# Patient Record
Sex: FEMALE | Race: BLACK OR AFRICAN AMERICAN | Employment: UNEMPLOYED | URBAN - METROPOLITAN AREA
[De-identification: names, ages, dates, MRNs, and addresses within clinical notes are randomized per-mention and may not be internally consistent; named-entity substitution may affect disease eponyms.]

---

## 2019-06-17 ENCOUNTER — DOCUMENTATION ONLY (OUTPATIENT)
Dept: INTERNAL MEDICINE CLINIC | Age: 59
End: 2019-06-17

## 2019-06-17 NOTE — PROGRESS NOTES
Pharmacist Note: Immunization Update    Patient: Leilani Arellano (55 y.o., 1960)     Patient's immunization history was updated to reflect information contained in the [place] and/or outside immunization/pharmacy records were reconciled within [facility]. Health Maintenance schedule updated when appropriate.     Current immunizations now reflect:       Immunization History   Administered Date(s) Administered    Influenza Vaccine 09/28/2015, 11/02/2016, 08/17/2017, 10/12/2018    Pneumococcal Polysaccharide (PPSV-23) 08/17/2017       Blanca Jaiml, PharmD, BCACP

## 2019-07-31 ENCOUNTER — APPOINTMENT (OUTPATIENT)
Dept: GENERAL RADIOLOGY | Age: 59
End: 2019-07-31
Attending: EMERGENCY MEDICINE
Payer: MEDICARE

## 2019-07-31 ENCOUNTER — HOSPITAL ENCOUNTER (OUTPATIENT)
Age: 59
Setting detail: OBSERVATION
Discharge: HOME | End: 2019-08-02
Attending: EMERGENCY MEDICINE | Admitting: FAMILY MEDICINE
Payer: MEDICARE

## 2019-07-31 ENCOUNTER — APPOINTMENT (OUTPATIENT)
Dept: CT IMAGING | Age: 59
End: 2019-07-31
Attending: EMERGENCY MEDICINE
Payer: MEDICARE

## 2019-07-31 DIAGNOSIS — R55 SYNCOPE AND COLLAPSE: Primary | ICD-10-CM

## 2019-07-31 DIAGNOSIS — N17.9 AKI (ACUTE KIDNEY INJURY) (HCC): ICD-10-CM

## 2019-07-31 DIAGNOSIS — R00.1 SINUS BRADYCARDIA: ICD-10-CM

## 2019-07-31 LAB
ALBUMIN SERPL-MCNC: 3.4 G/DL (ref 3.4–5)
ALBUMIN/GLOB SERPL: 1 {RATIO} (ref 0.8–1.7)
ALP SERPL-CCNC: 103 U/L (ref 45–117)
ALT SERPL-CCNC: 22 U/L (ref 13–56)
ANION GAP BLD CALC-SCNC: 19 MMOL/L (ref 10–20)
ANION GAP SERPL CALC-SCNC: 8 MMOL/L (ref 3–18)
AST SERPL-CCNC: 14 U/L (ref 10–38)
ATRIAL RATE: 54 BPM
BASOPHILS # BLD: 0 K/UL (ref 0–0.1)
BASOPHILS NFR BLD: 0 % (ref 0–2)
BILIRUB SERPL-MCNC: 0.3 MG/DL (ref 0.2–1)
BNP SERPL-MCNC: 166 PG/ML (ref 0–900)
BUN BLD-MCNC: 14 MG/DL (ref 7–18)
BUN SERPL-MCNC: 15 MG/DL (ref 7–18)
BUN/CREAT SERPL: 7 (ref 12–20)
CA-I BLD-MCNC: 1.19 MMOL/L (ref 1.12–1.32)
CALCIUM SERPL-MCNC: 8.6 MG/DL (ref 8.5–10.1)
CALCULATED P AXIS, ECG09: 63 DEGREES
CALCULATED R AXIS, ECG10: 9 DEGREES
CALCULATED T AXIS, ECG11: 0 DEGREES
CHLORIDE BLD-SCNC: 96 MMOL/L (ref 100–108)
CHLORIDE SERPL-SCNC: 99 MMOL/L (ref 100–111)
CO2 BLD-SCNC: 25 MMOL/L (ref 19–24)
CO2 SERPL-SCNC: 28 MMOL/L (ref 21–32)
CREAT SERPL-MCNC: 2.03 MG/DL (ref 0.6–1.3)
CREAT UR-MCNC: 2.2 MG/DL (ref 0.6–1.3)
DIAGNOSIS, 93000: NORMAL
DIFFERENTIAL METHOD BLD: ABNORMAL
EOSINOPHIL # BLD: 0.2 K/UL (ref 0–0.4)
EOSINOPHIL NFR BLD: 3 % (ref 0–5)
ERYTHROCYTE [DISTWIDTH] IN BLOOD BY AUTOMATED COUNT: 14.2 % (ref 11.6–14.5)
ETHANOL SERPL-MCNC: <3 MG/DL (ref 0–3)
GLOBULIN SER CALC-MCNC: 3.4 G/DL (ref 2–4)
GLUCOSE BLD STRIP.AUTO-MCNC: 115 MG/DL (ref 70–110)
GLUCOSE BLD STRIP.AUTO-MCNC: 137 MG/DL (ref 74–106)
GLUCOSE SERPL-MCNC: 137 MG/DL (ref 74–99)
HCT VFR BLD AUTO: 32.2 % (ref 35–45)
HCT VFR BLD CALC: 32 % (ref 36–49)
HGB BLD-MCNC: 10.7 G/DL (ref 12–16)
HGB BLD-MCNC: 10.9 G/DL (ref 12–16)
LYMPHOCYTES # BLD: 2.4 K/UL (ref 0.9–3.6)
LYMPHOCYTES NFR BLD: 28 % (ref 21–52)
MAGNESIUM SERPL-MCNC: 1.9 MG/DL (ref 1.6–2.6)
MCH RBC QN AUTO: 27.4 PG (ref 24–34)
MCHC RBC AUTO-ENTMCNC: 33.2 G/DL (ref 31–37)
MCV RBC AUTO: 82.4 FL (ref 74–97)
MONOCYTES # BLD: 1.2 K/UL (ref 0.05–1.2)
MONOCYTES NFR BLD: 14 % (ref 3–10)
NEUTS SEG # BLD: 4.8 K/UL (ref 1.8–8)
NEUTS SEG NFR BLD: 55 % (ref 40–73)
P-R INTERVAL, ECG05: 158 MS
PLATELET # BLD AUTO: 325 K/UL (ref 135–420)
PMV BLD AUTO: 8.6 FL (ref 9.2–11.8)
POTASSIUM BLD-SCNC: 4.2 MMOL/L (ref 3.5–5.5)
POTASSIUM SERPL-SCNC: 4.2 MMOL/L (ref 3.5–5.5)
PROT SERPL-MCNC: 6.8 G/DL (ref 6.4–8.2)
Q-T INTERVAL, ECG07: 518 MS
QRS DURATION, ECG06: 88 MS
QTC CALCULATION (BEZET), ECG08: 491 MS
RBC # BLD AUTO: 3.91 M/UL (ref 4.2–5.3)
SODIUM BLD-SCNC: 136 MMOL/L (ref 136–145)
SODIUM SERPL-SCNC: 135 MMOL/L (ref 136–145)
TROPONIN I SERPL-MCNC: <0.02 NG/ML (ref 0–0.04)
VENTRICULAR RATE, ECG03: 54 BPM
WBC # BLD AUTO: 8.6 K/UL (ref 4.6–13.2)

## 2019-07-31 PROCEDURE — 85025 COMPLETE CBC W/AUTO DIFF WBC: CPT

## 2019-07-31 PROCEDURE — 70450 CT HEAD/BRAIN W/O DYE: CPT

## 2019-07-31 PROCEDURE — 99218 HC RM OBSERVATION: CPT

## 2019-07-31 PROCEDURE — 96361 HYDRATE IV INFUSION ADD-ON: CPT

## 2019-07-31 PROCEDURE — 93005 ELECTROCARDIOGRAM TRACING: CPT

## 2019-07-31 PROCEDURE — 83880 ASSAY OF NATRIURETIC PEPTIDE: CPT

## 2019-07-31 PROCEDURE — 80307 DRUG TEST PRSMV CHEM ANLYZR: CPT

## 2019-07-31 PROCEDURE — 74011250636 HC RX REV CODE- 250/636: Performed by: EMERGENCY MEDICINE

## 2019-07-31 PROCEDURE — 82962 GLUCOSE BLOOD TEST: CPT

## 2019-07-31 PROCEDURE — 80047 BASIC METABLC PNL IONIZED CA: CPT

## 2019-07-31 PROCEDURE — 74011250636 HC RX REV CODE- 250/636: Performed by: FAMILY MEDICINE

## 2019-07-31 PROCEDURE — 84484 ASSAY OF TROPONIN QUANT: CPT

## 2019-07-31 PROCEDURE — 80053 COMPREHEN METABOLIC PANEL: CPT

## 2019-07-31 PROCEDURE — 74011250637 HC RX REV CODE- 250/637: Performed by: FAMILY MEDICINE

## 2019-07-31 PROCEDURE — 83735 ASSAY OF MAGNESIUM: CPT

## 2019-07-31 PROCEDURE — 99285 EMERGENCY DEPT VISIT HI MDM: CPT

## 2019-07-31 PROCEDURE — 83036 HEMOGLOBIN GLYCOSYLATED A1C: CPT

## 2019-07-31 PROCEDURE — 71045 X-RAY EXAM CHEST 1 VIEW: CPT

## 2019-07-31 PROCEDURE — 96360 HYDRATION IV INFUSION INIT: CPT

## 2019-07-31 RX ORDER — BENZTROPINE MESYLATE 1 MG/1
3 TABLET ORAL 2 TIMES DAILY
Status: DISCONTINUED | OUTPATIENT
Start: 2019-08-01 | End: 2019-08-02 | Stop reason: HOSPADM

## 2019-07-31 RX ORDER — POTASSIUM CHLORIDE 20 MEQ/1
20 TABLET, EXTENDED RELEASE ORAL DAILY
Status: DISCONTINUED | OUTPATIENT
Start: 2019-08-01 | End: 2019-08-02 | Stop reason: HOSPADM

## 2019-07-31 RX ORDER — ACETAMINOPHEN 325 MG/1
650 TABLET ORAL
Status: DISCONTINUED | OUTPATIENT
Start: 2019-07-31 | End: 2019-08-02 | Stop reason: HOSPADM

## 2019-07-31 RX ORDER — CITALOPRAM 20 MG/1
40 TABLET, FILM COATED ORAL DAILY
Status: DISCONTINUED | OUTPATIENT
Start: 2019-08-01 | End: 2019-08-01

## 2019-07-31 RX ORDER — MAGNESIUM SULFATE 100 %
4 CRYSTALS MISCELLANEOUS AS NEEDED
Status: DISCONTINUED | OUTPATIENT
Start: 2019-07-31 | End: 2019-08-02 | Stop reason: HOSPADM

## 2019-07-31 RX ORDER — LANOLIN ALCOHOL/MO/W.PET/CERES
400 CREAM (GRAM) TOPICAL 2 TIMES DAILY
COMMUNITY

## 2019-07-31 RX ORDER — GUAIFENESIN 100 MG/5ML
81 LIQUID (ML) ORAL 2 TIMES DAILY
Status: DISCONTINUED | OUTPATIENT
Start: 2019-07-31 | End: 2019-08-02 | Stop reason: HOSPADM

## 2019-07-31 RX ORDER — SODIUM CHLORIDE, SODIUM LACTATE, POTASSIUM CHLORIDE, CALCIUM CHLORIDE 600; 310; 30; 20 MG/100ML; MG/100ML; MG/100ML; MG/100ML
100 INJECTION, SOLUTION INTRAVENOUS CONTINUOUS
Status: DISCONTINUED | OUTPATIENT
Start: 2019-07-31 | End: 2019-08-02 | Stop reason: HOSPADM

## 2019-07-31 RX ORDER — SERTRALINE HYDROCHLORIDE 100 MG/1
100 TABLET, FILM COATED ORAL DAILY
COMMUNITY

## 2019-07-31 RX ORDER — METFORMIN HYDROCHLORIDE 500 MG/1
500 TABLET, FILM COATED, EXTENDED RELEASE ORAL DAILY
COMMUNITY

## 2019-07-31 RX ORDER — POTASSIUM CHLORIDE 20 MEQ/1
20 TABLET, EXTENDED RELEASE ORAL DAILY
COMMUNITY

## 2019-07-31 RX ORDER — ATORVASTATIN CALCIUM 40 MG/1
40 TABLET, FILM COATED ORAL DAILY
Status: DISCONTINUED | OUTPATIENT
Start: 2019-08-01 | End: 2019-08-02 | Stop reason: HOSPADM

## 2019-07-31 RX ORDER — DOCUSATE SODIUM 100 MG/1
100 CAPSULE, LIQUID FILLED ORAL 2 TIMES DAILY
Status: DISCONTINUED | OUTPATIENT
Start: 2019-08-01 | End: 2019-08-02 | Stop reason: HOSPADM

## 2019-07-31 RX ORDER — LANOLIN ALCOHOL/MO/W.PET/CERES
400 CREAM (GRAM) TOPICAL 2 TIMES DAILY
Status: DISCONTINUED | OUTPATIENT
Start: 2019-08-01 | End: 2019-08-02 | Stop reason: HOSPADM

## 2019-07-31 RX ORDER — ONDANSETRON 2 MG/ML
4 INJECTION INTRAMUSCULAR; INTRAVENOUS
Status: DISCONTINUED | OUTPATIENT
Start: 2019-07-31 | End: 2019-08-02 | Stop reason: HOSPADM

## 2019-07-31 RX ORDER — GUAIFENESIN 100 MG/5ML
81 LIQUID (ML) ORAL 2 TIMES DAILY
COMMUNITY

## 2019-07-31 RX ORDER — PANTOPRAZOLE SODIUM 20 MG/1
20 TABLET, DELAYED RELEASE ORAL DAILY
Status: DISCONTINUED | OUTPATIENT
Start: 2019-08-01 | End: 2019-08-02 | Stop reason: HOSPADM

## 2019-07-31 RX ORDER — OMEPRAZOLE 20 MG/1
20 CAPSULE, DELAYED RELEASE ORAL DAILY
COMMUNITY

## 2019-07-31 RX ORDER — LANOLIN ALCOHOL/MO/W.PET/CERES
325 CREAM (GRAM) TOPICAL DAILY
Status: DISCONTINUED | OUTPATIENT
Start: 2019-08-01 | End: 2019-08-02 | Stop reason: HOSPADM

## 2019-07-31 RX ORDER — INSULIN LISPRO 100 [IU]/ML
INJECTION, SOLUTION INTRAVENOUS; SUBCUTANEOUS
Status: DISCONTINUED | OUTPATIENT
Start: 2019-07-31 | End: 2019-08-01

## 2019-07-31 RX ADMIN — SODIUM CHLORIDE 1000 ML: 900 INJECTION, SOLUTION INTRAVENOUS at 16:21

## 2019-07-31 RX ADMIN — ASPIRIN 81 MG 81 MG: 81 TABLET ORAL at 23:22

## 2019-07-31 RX ADMIN — SODIUM CHLORIDE, SODIUM LACTATE, POTASSIUM CHLORIDE, AND CALCIUM CHLORIDE 100 ML/HR: 600; 310; 30; 20 INJECTION, SOLUTION INTRAVENOUS at 22:31

## 2019-07-31 NOTE — ED NOTES
Patient did receive one bag of fluids by EMS and a second bag in the ED for a total of 2000 ml    Patient ambulated to the restroom with ED tech, states she feels fine and is not dizzy

## 2019-07-31 NOTE — ED NOTES
Pt arrived via EMS. 0.5 Atropine was given and 18 G on left AC by EMS. Per EMS pt had a syncope and BP low.

## 2019-07-31 NOTE — ED PROVIDER NOTES
EMERGENCY DEPARTMENT HISTORY AND PHYSICAL EXAM    4:00 PM      Date: 7/31/2019  Patient Name: Joan Crenshaw    History of Presenting Illness     Chief Complaint   Patient presents with    Syncope    Slow Heart Rate         HISTORY: Joan Crenshaw is a 61 y.o. female with medical history as listed below who presents with syncopal episode yesterday and today. She reports similar episode in May. On review of her [facility] records she additionally has a history of CHF. In that admission she was noted to have nonsustained V. tach and runs of torsades due to low potassium and magnesium. Patient is currently on lisinopril for hypertension. She denies taking too much of her medication. The syncopal episode yesterday and then again today. On EMS arrival they noted that she was hypotensive and slightly bradycardic in the 50s. They did administer half a milligram of atropine without any change in her symptoms. She denies headache, chest pain, difficulty breathing, abdominal pain, vomiting, diarrhea. PCP: Selina Wright MD    Current Facility-Administered Medications   Medication Dose Route Frequency Provider Last Rate Last Dose    sodium chloride 0.9 % bolus infusion 1,000 mL  1,000 mL IntraVENous ONCE Desiree Charles MD         Current Outpatient Medications   Medication Sig Dispense Refill    docusate sodium (COLACE) 100 mg capsule Take 100 mg by mouth two (2) times a day.  LORazepam (ATIVAN) 0.5 mg tablet Take 1 Tab by mouth four (4) times daily. Max Daily Amount: 2 mg. 20 Tab 0    promethazine (PHENERGAN) 25 mg tablet Take 1 Tab by mouth every eight (8) hours as needed for Nausea. 12 Tab 0    Cane beto AD for chronic knee pain 1 Device 0    perphenazine (TRILAFON) 8 mg tablet Take 8 mg by mouth three (3) times daily.  nateglinide (STARLIX) 60 mg tablet Take 60 mg by mouth Before breakfast, lunch, and dinner.       ergocalciferol (VITAMIN D2) 50,000 unit capsule Take 50,000 Units by mouth.      citalopram (CELEXA) 40 mg tablet Take 40 mg by mouth daily.  Ferrous Fumarate 325 mg (106 mg iron) Tab Take  by mouth.  lovastatin (MEVACOR) 40 mg tablet Take 40 mg by mouth nightly.  lisinopril (PRINIVIL, ZESTRIL) 40 mg tablet Take 40 mg by mouth daily.  benztropine (COGENTIN) 2 mg tablet Take 3 mg by mouth two (2) times a day.  hydrochlorothiazide (HYDRODIURIL) 25 mg tablet Take 25 mg by mouth daily.  metFORMIN (GLUCOPHAGE) 1,000 mg tablet Take 1,000 mg by mouth two (2) times daily (with meals).  atorvastatin (LIPITOR) 40 mg tablet Take 40 mg by mouth daily.  naproxen sodium (ANAPROX DS) 550 mg tablet Take 1 Tab by mouth two (2) times daily (with meals). 12 Tab 0       Past History     Past Medical History:  Past Medical History:   Diagnosis Date    Chronic pain     Diabetes ([facility] [address]     Hyperlipemia     Hypertension     Obesity     Psychiatric disorder     depressioin       Past Surgical History:  History reviewed. No pertinent surgical history. Family History:  History reviewed. No pertinent family history. Social History:  Social History     Tobacco Use    Smoking status: Former Smoker     Types: Cigarettes    Smokeless tobacco: Never Used   Substance Use Topics    Alcohol use: No    Drug use: No     Comment: hx of cocaine       Allergies:  No Known Allergies      Review of Systems       Review of Systems   Constitutional: Negative for chills. HENT: Negative for congestion and sore throat. Respiratory: Negative for cough and shortness of breath. Cardiovascular: Negative for chest pain. Gastrointestinal: Negative for abdominal pain, diarrhea, nausea and vomiting. Genitourinary: Negative for dysuria. Musculoskeletal: Negative for back pain. Skin: Negative for rash. Neurological: Positive for syncope. Negative for dizziness and headaches. All other systems reviewed and are negative.         Physical Exam     Visit Vitals  BP 97/55   Pulse (!) 52   Temp 98.6 °F (37 °C)   Resp 28   Ht 5' 11\" (1.803 m)   Wt 117.9 kg (260 lb)   SpO2 98%   BMI 36.26 kg/m²       Physical Exam  General Exam: Patient is a well developed and well nourished in no distress. Patient does not appear acutely ill or toxic. Eye Exam: Lids and conjunctiva are normal  ENT Exam: The general head and facial exam is normal.  The neck is supple without meningeal signs. No significant adenopathy. Pulmonary Exam: No respiratory distress. The respiratory rate is normal.  No stridor. The breath sounds are equal bilaterally. There are no wheezes, rales, or rhonchi noted. Cardiac Exam: The cardiac rate is bradycardic and rhythm are normal.  No significant murmurs, rubs, or gallops. The peripheral pulses of the upper extremities are normal.  Abdominal Exam: Abdomen is soft and non-distended. No pulsatile masses. There is no local tenderness. There is no rebound or guarding noted. Skin and Soft Tissue: The skin is warm and dry, without significant abnormality. Good color. Musculoskeletal Exam: No peripheral edema. The musculoskeletal exam of the lower extremities is normal without significant local tenderness. Neurologic: Pt is alert and appropriate. Normal speech. Normal symmetric muscle strength and tone in all extremities. Psychiatric: Normal adult with appropriate demeanor and interpersonal interaction. Is oriented to person, place, and time. Diagnostic Study Results     Labs -  No results found for this or any previous visit (from the past 12 hour(s)). Radiologic Studies -   XR CHEST PORT    (Results Pending)   CT HEAD WO CONT    (Results Pending)         Medical Decision Making   I am the first provider for this patient. I reviewed the vital signs, available nursing notes, past medical history, past surgical history, family history and social history. Vital Signs-Reviewed the patient's vital signs. EKG: Interpreted by the EP.  EKG performed at [id number]. Interpretation rate 54, sinus bradycardia, no STEMI, minimal T wave inversions in anterior leads, QTC is 491  Records Reviewed: Nursing Notes (Time of Review: 4:00 PM)    ED Course: Progress Notes, Reevaluation, and Consults:      Provider Notes (Medical Decision Making): pt with 2 syncopal episodes, low BP on arrival, given atropine for bradycardia prior to arrival with no change. HR in 50s - pt without symptoms, do not think she needs more atropine. No CCB or BB in meds. zoe noted on labs. BP improved with fluids. No focal deficits to suggest stroke. Ct head without signs of ICH. Electrolytes without significant abnormalities. Recent admission to [facility] with torsades and non sustained v tach. Will admit for syncope eval and cardiac monitoring. Consult:  Discussed care with Dr. Alexandrea Davidson. Standard discussion; including history of patients chief complaint, available diagnostic results, and treatment course. Will see the pt.  7:40 PM, 7/31/2019       Diagnosis     Clinical Impression:     ICD-10-CM ICD-9-CM   1. Syncope and collapse R55 780.2   2. ZOE (acute kidney injury) ([facility] [address] N17.9 584.9   3. Sinus bradycardia R00.1 427.89         Disposition: ADMIT    Follow-up Information    None          Patient's Medications   Start Taking    No medications on file   Continue Taking    ATORVASTATIN (LIPITOR) 40 MG TABLET    Take 40 mg by mouth daily. BENZTROPINE (COGENTIN) 2 MG TABLET    Take 3 mg by mouth two (2) times a day. CANE ARIES    AD for chronic knee pain    CITALOPRAM (CELEXA) 40 MG TABLET    Take 40 mg by mouth daily. DOCUSATE SODIUM (COLACE) 100 MG CAPSULE    Take 100 mg by mouth two (2) times a day. ERGOCALCIFEROL (VITAMIN D2) 50,000 UNIT CAPSULE    Take 50,000 Units by mouth. FERROUS FUMARATE 325 MG (106 MG IRON) TAB    Take  by mouth. HYDROCHLOROTHIAZIDE (HYDRODIURIL) 25 MG TABLET    Take 25 mg by mouth daily.     LISINOPRIL (PRINIVIL, ZESTRIL) 40 MG TABLET    Take 40 mg by mouth daily. LORAZEPAM (ATIVAN) 0.5 MG TABLET    Take 1 Tab by mouth four (4) times daily. Max Daily Amount: 2 mg. LOVASTATIN (MEVACOR) 40 MG TABLET    Take 40 mg by mouth nightly. METFORMIN (GLUCOPHAGE) 1,000 MG TABLET    Take 1,000 mg by mouth two (2) times daily (with meals). NAPROXEN SODIUM (ANAPROX DS) 550 MG TABLET    Take 1 Tab by mouth two (2) times daily (with meals). NATEGLINIDE (STARLIX) 60 MG TABLET    Take 60 mg by mouth Before breakfast, lunch, and dinner. PERPHENAZINE (TRILAFON) 8 MG TABLET    Take 8 mg by mouth three (3) times daily. PROMETHAZINE (PHENERGAN) 25 MG TABLET    Take 1 Tab by mouth every eight (8) hours as needed for Nausea. These Medications have changed    No medications on file   Stop Taking    No medications on file     _______________________________    Please note that this dictation was completed with [employer], the [employer] voice recognition software. Quite often unanticipated grammatical, syntax, homophones, and other interpretive errors are inadvertently transcribed by the computer software. Please disregard these errors. Please excuse any errors that have escaped final proofreading.

## 2019-08-01 ENCOUNTER — APPOINTMENT (OUTPATIENT)
Dept: VASCULAR SURGERY | Age: 59
End: 2019-08-01
Attending: FAMILY MEDICINE
Payer: MEDICARE

## 2019-08-01 ENCOUNTER — APPOINTMENT (OUTPATIENT)
Dept: NON INVASIVE DIAGNOSTICS | Age: 59
End: 2019-08-01
Attending: FAMILY MEDICINE
Payer: MEDICARE

## 2019-08-01 PROBLEM — R94.31 PROLONGED Q-T INTERVAL ON ECG: Status: ACTIVE | Noted: 2019-08-01

## 2019-08-01 PROBLEM — Z86.79: Status: ACTIVE | Noted: 2019-08-01

## 2019-08-01 LAB
ANION GAP SERPL CALC-SCNC: 7 MMOL/L (ref 3–18)
BUN SERPL-MCNC: 14 MG/DL (ref 7–18)
BUN/CREAT SERPL: 12 (ref 12–20)
CALCIUM SERPL-MCNC: 8.4 MG/DL (ref 8.5–10.1)
CHLORIDE SERPL-SCNC: 103 MMOL/L (ref 100–111)
CO2 SERPL-SCNC: 28 MMOL/L (ref 21–32)
CREAT SERPL-MCNC: 1.13 MG/DL (ref 0.6–1.3)
ERYTHROCYTE [DISTWIDTH] IN BLOOD BY AUTOMATED COUNT: 14.2 % (ref 11.6–14.5)
EST. AVERAGE GLUCOSE BLD GHB EST-MCNC: 151 MG/DL
GLUCOSE BLD STRIP.AUTO-MCNC: 102 MG/DL (ref 70–110)
GLUCOSE BLD STRIP.AUTO-MCNC: 105 MG/DL (ref 70–110)
GLUCOSE BLD STRIP.AUTO-MCNC: 109 MG/DL (ref 70–110)
GLUCOSE BLD STRIP.AUTO-MCNC: 95 MG/DL (ref 70–110)
GLUCOSE SERPL-MCNC: 98 MG/DL (ref 74–99)
HBA1C MFR BLD: 6.9 % (ref 4.2–5.6)
HCT VFR BLD AUTO: 29.4 % (ref 35–45)
HGB BLD-MCNC: 9.6 G/DL (ref 12–16)
LEFT CCA DIST DIAS: 29 CM/S
LEFT CCA DIST SYS: 94.8 CM/S
LEFT CCA MID DIAS: 25.4 CM/S
LEFT CCA MID SYS: 105.8 CM/S
LEFT CCA PROX DIAS: 25.4 CM/S
LEFT CCA PROX SYS: 135 CM/S
LEFT ECA DIAS: 14.9 CM/S
LEFT ECA SYS: 64.4 CM/S
LEFT ICA DIST DIAS: 29.2 CM/S
LEFT ICA DIST SYS: 88.6 CM/S
LEFT ICA MID DIAS: 30.3 CM/S
LEFT ICA MID SYS: 77.6 CM/S
LEFT ICA PROX DIAS: 25.9 CM/S
LEFT ICA PROX SYS: 73.2 CM/S
LEFT ICA/CCA SYS: 0.7
LEFT SUBCLAVIAN DIAS: 0 CM/S
LEFT SUBCLAVIAN SYS: 101.4 CM/S
LEFT VERTEBRAL DIAS: 16.7 CM/S
LEFT VERTEBRAL SYS: 47.5 CM/S
MAGNESIUM SERPL-MCNC: 1.9 MG/DL (ref 1.6–2.6)
MCH RBC QN AUTO: 27.3 PG (ref 24–34)
MCHC RBC AUTO-ENTMCNC: 32.7 G/DL (ref 31–37)
MCV RBC AUTO: 83.5 FL (ref 74–97)
PLATELET # BLD AUTO: 293 K/UL (ref 135–420)
PMV BLD AUTO: 8.1 FL (ref 9.2–11.8)
POTASSIUM SERPL-SCNC: 3.9 MMOL/L (ref 3.5–5.5)
RBC # BLD AUTO: 3.52 M/UL (ref 4.2–5.3)
RIGHT CCA DIST DIAS: 20.5 CM/S
RIGHT CCA DIST SYS: 86.2 CM/S
RIGHT CCA MID DIAS: 16.8 CM/S
RIGHT CCA MID SYS: 104.5 CM/S
RIGHT CCA PROX DIAS: 15.8 CM/S
RIGHT CCA PROX SYS: 121.1 CM/S
RIGHT ECA DIAS: 11.2 CM/S
RIGHT ECA SYS: 71.7 CM/S
RIGHT ICA DIST DIAS: 37.6 CM/S
RIGHT ICA DIST SYS: 101.4 CM/S
RIGHT ICA MID DIAS: 25.3 CM/S
RIGHT ICA MID SYS: 97.7 CM/S
RIGHT ICA PROX DIAS: 23.2 CM/S
RIGHT ICA PROX SYS: 80.9 CM/S
RIGHT ICA/CCA SYS: 0.8
RIGHT SUBCLAVIAN DIAS: 0 CM/S
RIGHT SUBCLAVIAN SYS: 100.3 CM/S
RIGHT VERTEBRAL DIAS: 5.7 CM/S
RIGHT VERTEBRAL SYS: 42 CM/S
SODIUM SERPL-SCNC: 138 MMOL/L (ref 136–145)
WBC # BLD AUTO: 8.1 K/UL (ref 4.6–13.2)

## 2019-08-01 PROCEDURE — 93880 EXTRACRANIAL BILAT STUDY: CPT

## 2019-08-01 PROCEDURE — 85027 COMPLETE CBC AUTOMATED: CPT

## 2019-08-01 PROCEDURE — 77030021352 HC CBL LD SYS DISP COVD -B

## 2019-08-01 PROCEDURE — 93306 TTE W/DOPPLER COMPLETE: CPT

## 2019-08-01 PROCEDURE — 80048 BASIC METABOLIC PNL TOTAL CA: CPT

## 2019-08-01 PROCEDURE — 82962 GLUCOSE BLOOD TEST: CPT

## 2019-08-01 PROCEDURE — 36415 COLL VENOUS BLD VENIPUNCTURE: CPT

## 2019-08-01 PROCEDURE — 99218 HC RM OBSERVATION: CPT

## 2019-08-01 PROCEDURE — 83735 ASSAY OF MAGNESIUM: CPT

## 2019-08-01 PROCEDURE — 74011250636 HC RX REV CODE- 250/636: Performed by: FAMILY MEDICINE

## 2019-08-01 PROCEDURE — 74011250637 HC RX REV CODE- 250/637: Performed by: FAMILY MEDICINE

## 2019-08-01 RX ORDER — SERTRALINE HYDROCHLORIDE 25 MG/1
100 TABLET, FILM COATED ORAL DAILY
Status: DISCONTINUED | OUTPATIENT
Start: 2019-08-02 | End: 2019-08-02 | Stop reason: HOSPADM

## 2019-08-01 RX ORDER — LANOLIN ALCOHOL/MO/W.PET/CERES
400 CREAM (GRAM) TOPICAL ONCE
Status: DISCONTINUED | OUTPATIENT
Start: 2019-08-01 | End: 2019-08-01

## 2019-08-01 RX ORDER — INSULIN LISPRO 100 [IU]/ML
INJECTION, SOLUTION INTRAVENOUS; SUBCUTANEOUS
Status: DISCONTINUED | OUTPATIENT
Start: 2019-08-01 | End: 2019-08-02 | Stop reason: HOSPADM

## 2019-08-01 RX ORDER — QUETIAPINE FUMARATE 100 MG/1
100 TABLET, FILM COATED ORAL
Status: DISCONTINUED | OUTPATIENT
Start: 2019-08-02 | End: 2019-08-02 | Stop reason: HOSPADM

## 2019-08-01 RX ADMIN — MAGNESIUM OXIDE TAB 400 MG (241.3 MG ELEMENTAL MG) 400 MG: 400 (241.3 MG) TAB at 09:09

## 2019-08-01 RX ADMIN — DOCUSATE SODIUM 100 MG: 100 CAPSULE, LIQUID FILLED ORAL at 18:35

## 2019-08-01 RX ADMIN — ATORVASTATIN CALCIUM 40 MG: 40 TABLET, FILM COATED ORAL at 09:09

## 2019-08-01 RX ADMIN — SODIUM CHLORIDE, SODIUM LACTATE, POTASSIUM CHLORIDE, AND CALCIUM CHLORIDE 100 ML/HR: 600; 310; 30; 20 INJECTION, SOLUTION INTRAVENOUS at 20:52

## 2019-08-01 RX ADMIN — MAGNESIUM OXIDE TAB 400 MG (241.3 MG ELEMENTAL MG) 400 MG: 400 (241.3 MG) TAB at 18:35

## 2019-08-01 RX ADMIN — BENZTROPINE MESYLATE 3 MG: 1 TABLET ORAL at 20:52

## 2019-08-01 RX ADMIN — FERROUS SULFATE TAB 325 MG (65 MG ELEMENTAL FE) 325 MG: 325 (65 FE) TAB at 09:09

## 2019-08-01 RX ADMIN — ASPIRIN 81 MG 81 MG: 81 TABLET ORAL at 09:09

## 2019-08-01 RX ADMIN — DOCUSATE SODIUM 100 MG: 100 CAPSULE, LIQUID FILLED ORAL at 09:09

## 2019-08-01 RX ADMIN — PANTOPRAZOLE SODIUM 20 MG: 20 TABLET, DELAYED RELEASE ORAL at 09:09

## 2019-08-01 RX ADMIN — BENZTROPINE MESYLATE 3 MG: 1 TABLET ORAL at 09:09

## 2019-08-01 RX ADMIN — POTASSIUM CHLORIDE 20 MEQ: 20 TABLET, EXTENDED RELEASE ORAL at 09:09

## 2019-08-01 RX ADMIN — ASPIRIN 81 MG 81 MG: 81 TABLET ORAL at 20:52

## 2019-08-01 RX ADMIN — CITALOPRAM HYDROBROMIDE 40 MG: 20 TABLET ORAL at 09:09

## 2019-08-01 NOTE — PROGRESS NOTES
Problem: Falls - Risk of  Goal: *Absence of Falls  Description  Document Alphonso Miller Fall Risk and appropriate interventions in the flowsheet. Outcome: Progressing Towards Goal  Note:   Fall Risk Interventions:                 Elimination Interventions: Call light in reach, Patient to call for help with toileting needs, Toileting schedule/hourly rounds    History of Falls Interventions: Door open when patient unattended, Evaluate medications/consider consulting pharmacy, Room close to nurse's station, Bed/chair exit alarm         Problem: Patient Education: Go to Patient Education Activity  Goal: Patient/Family Education  Outcome: Progressing Towards Goal     Problem: Diabetes Self-Management  Goal: *Disease process and treatment process  Description  Define diabetes and identify own type of diabetes; list 3 options for treating diabetes. Outcome: Progressing Towards Goal  Goal: *Incorporating nutritional management into lifestyle  Description  Describe effect of type, amount and timing of food on blood glucose; list 3 methods for planning meals. Outcome: Progressing Towards Goal  Goal: *Incorporating physical activity into lifestyle  Description  State effect of exercise on blood glucose levels. Outcome: Progressing Towards Goal  Goal: *Developing strategies to promote health/change behavior  Description  Define the ABC's of diabetes; identify appropriate screenings, schedule and personal plan for screenings. Outcome: Progressing Towards Goal  Goal: *Using medications safely  Description  State effect of diabetes medications on diabetes; name diabetes medication taking, action and side effects. Outcome: Progressing Towards Goal  Goal: *Monitoring blood glucose, interpreting and using results  Description  Identify recommended blood glucose targets  and personal targets.   Outcome: Progressing Towards Goal  Goal: *Prevention, detection, treatment of acute complications  Description  List symptoms of hyper- and hypoglycemia; describe how to treat low blood sugar and actions for lowering  high blood glucose level. Outcome: Progressing Towards Goal  Goal: *Prevention, detection and treatment of chronic complications  Description  Define the natural course of diabetes and describe the relationship of blood glucose levels to long term complications of diabetes.   Outcome: Progressing Towards Goal  Goal: *Developing strategies to address psychosocial issues  Description  Describe feelings about living with diabetes; identify support needed and support network  Outcome: Progressing Towards Goal  Goal: *Sick day guidelines  Outcome: Progressing Towards Goal  Goal: *Patient Specific Goal (EDIT GOAL, INSERT TEXT)  Outcome: Progressing Towards Goal     Problem: Patient Education: Go to Patient Education Activity  Goal: Patient/Family Education  Outcome: Progressing Towards Goal

## 2019-08-01 NOTE — ASSESSMENT & PLAN NOTE
Possibly related to bradycardia and hypotension which have improved. Monitor electrolytes with serial labs.   Echo and carotid US in am

## 2019-08-01 NOTE — ED NOTES
TRANSFER - ED to INPATIENT REPORT:    SBAR report made available to receiving floor on this patient being transferred to [address] [place] [address] ([facility])  for routine progression of care       Admitting diagnosis Syncope [R55]    Information from the following report(s) SBAR, ED Summary, MAR and Cardiac Rhythm S.Bradycardia with prolong QT was made available to receiving floor. Lines:   Peripheral IV 07/31/19 Left Antecubital (Active)   Site Assessment Clean, dry, & intact 7/31/2019  4:10 PM   Phlebitis Assessment 0 7/31/2019  4:10 PM   Infiltration Assessment 0 7/31/2019  4:10 PM   Dressing Status Clean, dry, & intact 7/31/2019  4:10 PM       Peripheral IV 07/31/19 Left Antecubital (Active)   Site Assessment Clean, dry, & intact 7/31/2019  4:05 PM   Phlebitis Assessment 0 7/31/2019  4:05 PM   Infiltration Assessment 0 7/31/2019  4:05 PM   Dressing Status Clean, dry, & intact 7/31/2019  4:05 PM   Dressing Type Transparent 7/31/2019  4:05 PM        Medication list updated today    Opportunity for questions and clarification was provided.       Patient is oriented to time, place, person and situation   Patient is  continent and ambulatory with assist     Valuables transported with patient     Patient transported with:   Tech    MAP (Monitor): 79 =Monitored (most recent)  Vitals w/ MEWS Score (last day)     Date/Time MEWS Score Pulse Resp Temp BP Level of Consciousness SpO2    07/31/19 2021  2  (!) 57  27  98.3 °F (36.8 °C)  124/66  Alert  100 %    07/31/19 1950    (!) 58  21    99/77    99 %    07/31/19 1930    (!) 54  24    118/73    97 %    07/31/19 1900    (!) 56  26    127/66    98 %    07/31/19 1850    (!) 55  21    116/78    99 %    07/31/19 1840    (!) 56  24    128/75    100 %    07/31/19 1830    (!) 56  21    120/76    100 %    07/31/19 1820    (!) 58  22    107/68    100 %    07/31/19 1817          120/71        07/31/19 1750    60  18    (!) 81/58    99 %    07/31/19 1730    60  21    (!) 82/45    99 %    07/31/19 1650    (!) 52  24    98/52    98 %    07/31/19 1640    (!) 52  26    98/59    97 %    07/31/19 1619    (!) 53  21        97 %    07/31/19 1615    (!) 53  18        98 %    07/31/19 1610    (!) 57  21    91/55    100 %    07/31/19 1602    80  28    (!) 86/65    99 %    07/31/19 1600    71  16        98 %    07/31/19 1555    (!) 53  28        96 %    07/31/19 1553  3  (!) 52  28  98.6 °F (37 °C)  97/55  Alert  98 %    07/31/19 1550    (!) 53  28    93/63    98 %

## 2019-08-01 NOTE — H&P
Internal Medicine History and Physical          Subjective     HPI: Desiree Richard is a 61 y.o. female who presented to the ED with a syncopal episode at home w/o aura or premonitory sx. Denied chest pain or SOB. In the field she was found to be bradycardic and hypotensive, received atropine in the field. ED evaluation revealed unremarkable labs other than ZOE that is mild, no acute changes on EKG. Normal CT head. Will admit for further evaluation and treatment      PMHx:  Past Medical History:   Diagnosis Date    Chronic pain     Diabetes ([facility] [address]     Hyperlipemia     Hypertension     Obesity     Psychiatric disorder     depressioin       PSurgHx:  History reviewed. No pertinent surgical history.     SocialHx:  Social History     Socioeconomic History    Marital status:      Spouse name: Not on file    Number of children: Not on file    Years of education: Not on file    Highest education level: Not on file   Occupational History    Not on file   Social Needs    Financial resource strain: Not on file    Food insecurity:     Worry: Not on file     Inability: Not on file    Transportation needs:     Medical: Not on file     Non-medical: Not on file   Tobacco Use    Smoking status: Former Smoker     Types: Cigarettes    Smokeless tobacco: Never Used   Substance and Sexual Activity    Alcohol use: No    Drug use: No     Comment: hx of cocaine    Sexual activity: Not on file   Lifestyle    Physical activity:     Days per week: Not on file     Minutes per session: Not on file    Stress: Not on file   Relationships    Social connections:     Talks on phone: Not on file     Gets together: Not on file     Attends [employer] service: Not on file     Active member of club or organization: Not on file     Attends meetings of clubs or organizations: Not on file     Relationship status: Not on file    Intimate partner violence:     Fear of current or ex partner: Not on file     Emotionally abused: Not on file     Physically abused: Not on file     Forced sexual activity: Not on file   Other Topics Concern    Not on file   Social History Narrative    Not on file       Allergies:  No Known Allergies    FamilyHx:  History reviewed. No pertinent family history. Prior to Admission Medications   Prescriptions Last Dose Informant Patient Reported? Taking? Cane beto   No No   Sig: AD for chronic knee pain   Ferrous Fumarate 325 mg (106 mg iron) Tab   Yes No   Sig: Take  by mouth. LORazepam (ATIVAN) 0.5 mg tablet   No No   Sig: Take 1 Tab by mouth four (4) times daily. Max Daily Amount: 2 mg. aspirin 81 mg chewable tablet   Yes Yes   Sig: Take 81 mg by mouth two (2) times a day. atorvastatin (LIPITOR) 40 mg tablet   Yes No   Sig: Take 40 mg by mouth daily. benztropine (COGENTIN) 2 mg tablet   Yes No   Sig: Take 3 mg by mouth two (2) times a day. citalopram (CELEXA) 40 mg tablet   Yes No   Sig: Take 40 mg by mouth daily. docusate sodium (COLACE) 100 mg capsule   Yes No   Sig: Take 100 mg by mouth two (2) times a day. ergocalciferol (VITAMIN D2) 50,000 unit capsule   Yes No   Sig: Take 50,000 Units by mouth. hydrochlorothiazide (HYDRODIURIL) 25 mg tablet   Yes No   Sig: Take 25 mg by mouth daily. lisinopril (PRINIVIL, ZESTRIL) 40 mg tablet   Yes No   Sig: Take 40 mg by mouth daily. lovastatin (MEVACOR) 40 mg tablet   Yes No   Sig: Take 40 mg by mouth nightly.   magnesium oxide (MAG-OX) 400 mg tablet   Yes Yes   Sig: Take 400 mg by mouth two (2) times a day. metFORMIN (GLUCOPHAGE) 1,000 mg tablet   Yes No   Sig: Take 1,000 mg by mouth two (2) times daily (with meals). metFORMIN (GLUMETZA ER) 500 mg TG24 24 hour tablet   Yes Yes   Sig: Take 500 mg by mouth daily. naproxen sodium (ANAPROX DS) 550 mg tablet   No No   Sig: Take 1 Tab by mouth two (2) times daily (with meals).    nateglinide (STARLIX) 60 mg tablet   Yes No   Sig: Take 60 mg by mouth Before breakfast, lunch, and dinner. omeprazole (PRILOSEC) 20 mg capsule   Yes Yes   Sig: Take 20 mg by mouth daily. perphenazine (TRILAFON) 8 mg tablet   Yes No   Sig: Take 8 mg by mouth three (3) times daily. potassium chloride (K-DUR, KLOR-CON) 20 mEq tablet   Yes Yes   Sig: Take 20 mEq by mouth daily. promethazine (PHENERGAN) 25 mg tablet   No No   Sig: Take 1 Tab by mouth every eight (8) hours as needed for Nausea. sertraline (ZOLOFT) 100 mg tablet   Yes Yes   Sig: Take 100 mg by mouth daily.       Facility-Administered Medications: None       Review of Systems:  CONST: fever(-),   chills(-),   fatigue(-),   malaise(-)  SKIN: itching(-),   rash(-)  EYES: vision - no change from baseline  ENT: ear pain(-),   nasal discharge(-),   sore throat(-),   voice change(-)  RESP: SOB(-),   cough(-),   hemoptysis(-)  CV: chest pain(-),   PND(-),   orthopnea(-),   exertional dyspnea(-),   palpitations(-), syncope(-)  GI: abd pain(-),   nausea(-),   vomiting(-),   diarrhea(-),   melena(-),   hematemesis(-), hematochesia(-)  : dysuria(-),   frequency(-),   urgency(-),   hematuria(-)  MS: arthralgias(-),   myalgias(-)  NEURO: speech w/o change,   tremors(-),   seizures(-),   numbness(-),   dizziness(-)    Objective      Visit Vitals  BP 99/77   Pulse (!) 58   Temp 98.6 °F (37 °C)   Resp 21   Ht 5' 11\" (1.803 m)   Wt 117.9 kg (260 lb)   SpO2 99%   BMI 36.26 kg/m²       Physical Exam:  CONST: NAD,   VSS reviewed  SKIN: rashes(-),   ulcers(-)  EYES: PERRL,   EOMI,   sclerae w/o jaundice  HENT: HEENT,   normal appearing nose and ears,   normal nasal mucosa and turbinates  NECK: supple,   no LA,   trachea is midline,   thyroid w/o goiter or palpable nodules  RESP: normal respiratory effort,   wheezes(-),   rales(-),   rhochi(-),   no consolidation on percussion  CHEST: normal axillae,   retractions(-),   use of accessory muscles(-)  CV: JVD(-),   carotid bruits(-),   RRR,   gallops(-),   murmurs(-),   edema LE(-),   abd bruits(-),   peripheral pulses normal  ABD: soft, tender(-),   distended(-),   HSM(-),   BS(+) in all quadrants,   peritoneal signs(-)  MS: NROM in all extremities,  clubbing(-),   peripheral cyanosis(-)  NEURO: speech normal, tremors(-),   CN II-XII(-),   lateralizing signs(-)  PSYCH: AOC x 3,   appropriate affect,   non-suicidal      Laboratory Studies:  CMP:   Lab Results   Component Value Date/Time     (L) 07/31/2019 04:00 PM    K 4.2 07/31/2019 04:00 PM    CL 99 (L) 07/31/2019 04:00 PM    CO2 28 07/31/2019 04:00 PM    AGAP 8 07/31/2019 04:00 PM     (H) 07/31/2019 04:00 PM    BUN 15 07/31/2019 04:00 PM    CREA 2.03 (H) 07/31/2019 04:00 PM    GFRAA 30 (L) 07/31/2019 04:00 PM    GFRNA 25 (L) 07/31/2019 04:00 PM    CA 8.6 07/31/2019 04:00 PM    MG 1.9 07/31/2019 04:00 PM    ALB 3.4 07/31/2019 04:00 PM    TP 6.8 07/31/2019 04:00 PM    GLOB 3.4 07/31/2019 04:00 PM    AGRAT 1.0 07/31/2019 04:00 PM    SGOT 14 07/31/2019 04:00 PM    ALT 22 07/31/2019 04:00 PM     CBC:   Lab Results   Component Value Date/Time    WBC 8.6 07/31/2019 04:00 PM    HGB 10.7 (L) 07/31/2019 04:00 PM    HCT 32.2 (L) 07/31/2019 04:00 PM     07/31/2019 04:00 PM     All Cardiac Markers in the last 24 hours:   Lab Results   Component Value Date/Time    TROIQ <0.02 07/31/2019 04:00 PM     Liver Panel:   Lab Results   Component Value Date/Time    ALB 3.4 07/31/2019 04:00 PM    TP 6.8 07/31/2019 04:00 PM    GLOB 3.4 07/31/2019 04:00 PM    AGRAT 1.0 07/31/2019 04:00 PM    SGOT 14 07/31/2019 04:00 PM    ALT 22 07/31/2019 04:00 PM     07/31/2019 04:00 PM         Imaging Reviewed:  Ct Head Wo Cont    Result Date: 7/31/2019  EXAM: CT head INDICATION: Syncope/fainting COMPARISON: None. TECHNIQUE: Axial CT imaging of the head was performed without intravenous contrast. One or more dose reduction techniques were used on this CT: automated exposure control, adjustment of the mAs and/or kVp according to patient size, and iterative reconstruction techniques. The specific techniques used on this CT exam have been documented in the patient's electronic medical record. Digital Imaging and Communications in Medicine (DICOM) format image data are available to nonaffiliated external healthcare facilities or entities on a secured, media free, reciprocally searchable basis with patient authorization for at least a 12-month period after this study. _______________ FINDINGS: BRAIN AND POSTERIOR FOSSA: There is no intracranial hemorrhage, mass effect, or midline shift. There are no areas of abnormal parenchymal attenuation. EXTRA-AXIAL SPACES AND MENINGES: There are no abnormal extra-axial fluid collections. CALVARIUM: Intact. SINUSES: Clear. OTHER: None. _______________     IMPRESSION: 1. No acute intracranial process. Xr Chest Port    Result Date: 7/31/2019  EXAM: XR CHEST PORT CLINICAL INDICATION/HISTORY: Syncopal event -Additional: None COMPARISON: June 28, 2015 TECHNIQUE: Frontal view of the chest _______________ FINDINGS: HEART AND MEDIASTINUM: Stable cardiac size and mediastinal contours, with cardiac size upper limits of normal for AP technique. LUNGS AND PLEURAL SPACES: No focal pneumonic consolidation, pneumothorax, or pleural effusion. BONY THORAX AND SOFT TISSUES: No acute osseous abnormality. Mild degenerative changes of the shoulder girdles present bilaterally. Defibrillating pad projects over the chest wall. _______________     IMPRESSION: No acute radiographic cardiopulmonary abnormality. Assessment/Plan     Active Hospital Problems    Diagnosis Date Noted    Syncope 07/31/2019     Syncope  Possibly related to bradycardia and hypotension which have improved. Monitor electrolytes with serial labs. Echo and carotid US in am      - Cont acceptable home medications for chronic conditions   - DVT protocol and GI prophylaxis    I have personally reviewed all pertinent labs, films and EKGs that have officially resulted.  I reviewed available electronic documentation outlining the initial presentation as well as the emergency room physician's encounter. Total time spent with patient and chart review, orders and documentation - 45min out of which more than 50% spent face-to-face with patient.     Bia Looney MD  7/31/2019   8:15 PM      Juli[facility]  [facility]

## 2019-08-01 NOTE — PROGRESS NOTES
Progress Note      Patient: Joan Crenshaw               Sex: female          DOA: 7/31/2019       YOB: 1960      Age:  61 y.o.        LOS:  LOS: 0 days             CHIEF COMPLAINT:  Syncope and Slow Heart Rate      Assessment/Plan:     Principal Problem:    Syncope (7/31/2019)    Active Problems:    History of depression (4/8/2013)      Prolonged Q-T interval on ECG (8/1/2019)      History of torsades de pointe due to drug (8/1/2019)        PLAN:  H/o recent syncope 2/2 to TdP. Cardiology consult  Echo ordered, not yet done  Replace Mg and K  D/c celexa and switch to seroquel and zoloft, per [facility] recommendation from [facility] d/t prolonged QTc  Telemetry monitoring    Subjective:     Pt said she feels much better today. Denies CP or SOB. Objective:     Visit Vitals  /74 (BP 1 Location: Right arm, BP Patient Position: At rest)   Pulse 62   Temp 98.6 °F (37 °C)   Resp 20   Ht 5' 11\" (1.803 m)   Wt 117.9 kg (260 lb)   SpO2 98%   BMI 36.26 kg/m²        Physical Exam:  Ears: hearing is intact  Eyes: Icterus is not present  Lungs: clear to auscultation bilaterally  Heart: regular rate and rhythm, S1, S2 normal, no murmur, click, rub or gallop  Gastrointestinal: soft, non-tender.  Bowel sounds normal. No masses,  no organomegaly  Neurological:  New Focal Deficits are not present  Psychiatric:  Mood is stable    Lab/Data Reviewed:  CMP:   Lab Results   Component Value Date/Time     08/01/2019 05:30 AM    K 3.9 08/01/2019 05:30 AM     08/01/2019 05:30 AM    CO2 28 08/01/2019 05:30 AM    AGAP 7 08/01/2019 05:30 AM    GLU 98 08/01/2019 05:30 AM    BUN 14 08/01/2019 05:30 AM    CREA 1.13 08/01/2019 05:30 AM    GFRAA 60 (L) 08/01/2019 05:30 AM    GFRNA 49 (L) 08/01/2019 05:30 AM    CA 8.4 (L) 08/01/2019 05:30 AM    MG 1.9 08/01/2019 05:30 AM     CBC:   Lab Results   Component Value Date/Time    WBC 8.1 08/01/2019 05:30 AM    HGB 9.6 (L) 08/01/2019 05:30 AM    HCT 29.4 (L) 08/01/2019 05:30 AM  08/01/2019 05:30 AM       Imaging Reviewed:  No results found.

## 2019-08-01 NOTE — PROGRESS NOTES
TRANSFER - IN REPORT:    Verbal report received from [address] (name) on Jess Schwartz  being received from ED (unit) for routine progression of care      Report consisted of patients Situation, Background, Assessment and   Recommendations(SBAR). Information from the following report(s) SBAR, ED Summary and MAR was reviewed with the receiving nurse. Opportunity for questions and clarification was provided. Assessment completed upon patients arrival to unit and care assumed. 0740: Bedside shift change report given to Dianelys Fleming Dr. (oncoming nurse) by Yamilet Orantes RN  (offgoing nurse). Report included the following information SBAR, ED Summary, MAR and Quality Measures. Opportunity for questions and clarification provided.

## 2019-08-01 NOTE — PROGRESS NOTES
Verbal shift change report given to Drew RN (oncoming nurse) by Shama RN (charge nurse). Report included the following information SBAR, Kardex and Procedure Summary. 1800 pt did not eat dinner reports it didn't taste good. 1940 Bedside and Verbal shift change report given to [facility], RN (oncoming nurse) by MIGUEL Russell (offgoing nurse). Report included the following information SBAR, Kardex, MAR and Recent Results.

## 2019-08-01 NOTE — PROGRESS NOTES
Problem: Falls - Risk of  Goal: *Absence of Falls  Description  Document Issac Jacobson Fall Risk and appropriate interventions in the flowsheet.   Outcome: Progressing Towards Goal  Note:   Fall Risk Interventions:                      History of Falls Interventions: Door open when patient unattended         Problem: Patient Education: Go to Patient Education Activity  Goal: Patient/Family Education  Outcome: Progressing Towards Goal

## 2019-08-01 NOTE — ROUTINE PROCESS
1045 Bedside and Verbal shift change report given to keyonna (oncoming nurse) by Andres Zayas RN 
 (offgoing nurse). Report included the following information Kardex, MAR and Recent Results.

## 2019-08-01 NOTE — CONSULTS
[facility] - Consult Note    Consultation request by Jennyfer Winn MD for advice/opinion related to evaluating Syncope [R55]    Date of  Admission: 7/31/2019  3:46 PM   Primary Care Physician:  Lorraine Sinhg MD  Patient seen and examined independently. Past [facility] hospitalization details reviewed. Admission which cardiogram did demonstrate QT prolongation and the patient is still on citalopram despite discontinuation of the medicine. Echocardiogram pending. Agree with assessment and plan as noted below. Guerline Freire MD   Assessment:     Patient Active Problem List   Diagnosis Code    Knee pain, bilateral M25.561, M25.562    Chronic pain syndrome G89.4    Encounter for long-term (current) use of other medications Z79.899    OA (osteoarthritis) M19.90    History of depression Z86.59    Syncope R55     - Near syncope with hypotension and bradycardia  - EKG with prolonged QT  - Recent admission to [facility] 5/2019 for syncope/NSVT and Torsades secondary to hypokalemia. Also with hypomagnesemia at that time. Citalopram was discontinued and she was transitioned to Zoloft and Seroquel  - Admitted for SI in 4/2019 and started on citalopram and trazodone (no longer taking?)  - Echo from 5/2019 at [facility]: LVEF 55%, no WMA, moderate concentric LVH, moderate grade 2 DDx  - Hypertension  - Diabetes mellitus type 2  - Depression, bipolar   - History of alcohol dependence and tobacco use; patient states she quit both x 6 months     Plan:     Patient with QT prolongation on EKG and history of Torsades secondary to hypokalemia during admission at [facility] from 5/2019. Patient appears unsure of her current medication list, but was supposed to discontinue her citalopram and start zoloft and seroquel per Monalisa's most recent note. Recommend discontinuing citalopram and would avoid any other QT prolonging agents    Continue to replace electrolytes as indicated.    Telemetry monitoring   Echocardiogram ordered to reassess CV structure/function     History of Present Illness: This is a 61 y.o. female admitted for Syncope [R55]. Patient complains of near syncope prior to arrival. She admits that she was not hydrating appropriately. She denies complete LOC, chest pain, palpitations, N/V. She did have a similar episode back in May 2019 and was hospitalized at [facility]. She was found to have low K and mag and went into Torsades. She also had syncope with NSVT. At that time, she was advised to discontinue her citalopram; however, it appears she continued taking it. She does have notable QT prolongation on her EKG. Pmhx: DM2, HFpEF, HTN, bipolar/depression, hx/o alcohol use and tobacco use      Cardiac risk factors: smoking/ tobacco exposure, dyslipidemia, diabetes mellitus, sedentary life style, hypertension, post-menopausal      Review of Symptoms:  Except as stated above include:  Constitutional:  negative  Respiratory:  negative  Cardiovascular:  + near syncope  Gastrointestinal: negative  Genitourinary:  negative  Musculoskeletal:  Negative  Neurological:  Negative  Dermatological:  Negative  Endocrinological: Negative  Psychological:  Negative         Past Medical History:     Past Medical History:   Diagnosis Date    Chronic pain     Diabetes ([facility] [address]     Hyperlipemia     Hypertension     Obesity     Psychiatric disorder     depressioin         Social History:     Social History     Socioeconomic History    Marital status:      Spouse name: Not on file    Number of children: Not on file    Years of education: Not on file    Highest education level: Not on file   Tobacco Use    Smoking status: Former Smoker     Types: Cigarettes    Smokeless tobacco: Never Used   Substance and Sexual Activity    Alcohol use: No    Drug use: No     Comment: hx of cocaine        Family History:   History reviewed. No pertinent family history.      Medications:   No Known Allergies     Current Facility-Administered Medications   Medication Dose Route Frequency    insulin lispro (HUMALOG) injection   SubCUTAneous QID AFTER MEALS    aspirin chewable tablet 81 mg  81 mg Oral BID    atorvastatin (LIPITOR) tablet 40 mg  40 mg Oral DAILY    benztropine (COGENTIN) tablet 3 mg  3 mg Oral BID    citalopram (CELEXA) tablet 40 mg  40 mg Oral DAILY    docusate sodium (COLACE) capsule 100 mg  100 mg Oral BID    ferrous sulfate tablet 325 mg  325 mg Oral DAILY    magnesium oxide (MAG-OX) tablet 400 mg  400 mg Oral BID    pantoprazole (PROTONIX) tablet 20 mg  20 mg Oral DAILY    potassium chloride (K-DUR, KLOR-CON) SR tablet 20 mEq  20 mEq Oral DAILY    lactated Ringers infusion  100 mL/hr IntraVENous CONTINUOUS    acetaminophen (TYLENOL) tablet 650 mg  650 mg Oral Q4H PRN    ondansetron (ZOFRAN) injection 4 mg  4 mg IntraVENous Q4H PRN    glucose chewable tablet 16 g  4 Tab Oral PRN    glucagon (GLUCAGEN) injection 1 mg  1 mg IntraMUSCular PRN    dextrose 10 % infusion 125-250 mL  125-250 mL IntraVENous PRN         Physical Exam:     Visit Vitals  /60   Pulse 64   Temp 99 °F (37.2 °C)   Resp 20   Ht 5' 11\" (1.803 m)   Wt 117.9 kg (260 lb)   SpO2 98%   BMI 36.26 kg/m²     BP Readings from Last 3 Encounters:   08/01/19 102/60   06/28/15 136/65   03/26/15 138/87     Pulse Readings from Last 3 Encounters:   08/01/19 64   06/28/15 70   03/26/15 94     Wt Readings from Last 3 Encounters:   08/01/19 117.9 kg (260 lb)   06/28/15 130.2 kg (287 lb)   02/02/15 134.7 kg (297 lb)       General:  alert, cooperative, no distress, appears stated age  Neck:  nontender, no masses, no stridor, no JVD  Lungs:  clear to auscultation bilaterally  Heart:  regular rate and rhythm, S1, S2 normal, no murmur, click, rub or gallop  Abdomen:  abdomen is soft without significant tenderness, masses, organomegaly or guarding  Extremities:  extremities normal, atraumatic, no cyanosis or edema  Skin: Warm and dry.  no hyperpigmentation, vitiligo, or suspicious lesions  Neuro: alert, oriented x3, affect appropriate, no focal neurological deficits, moves all extremities well, no involuntary movements  Psych: normal mood      Data Review:     Recent Labs     08/01/19  0530 07/31/19  1600   WBC 8.1 8.6   HGB 9.6* 10.7*   HCT 29.4* 32.2*    325     Recent Labs     08/01/19  0530 07/31/19  1600    135*   K 3.9 4.2    99*   CO2 28 28   GLU 98 137*   BUN 14 15   CREA 1.13 2.03*   CA 8.4* 8.6   MG 1.9 1.9   ALB  --  3.4   SGOT  --  14   ALT  --  22       Results for orders placed or performed during the hospital encounter of 07/31/19   EKG, 12 LEAD, INITIAL   Result Value Ref Range    Ventricular Rate 54 BPM    Atrial Rate 54 BPM    P-R Interval 158 ms    QRS Duration 88 ms    Q-T Interval 518 ms    QTC Calculation (Bezet) 491 ms    Calculated P Axis 63 degrees    Calculated R Axis 9 degrees    Calculated T Axis 0 degrees    Diagnosis       Sinus bradycardia  T wave abnormality, consider anterior ischemia  Prolonged QT  Abnormal ECG  When compared with ECG of 28-JUN-2015 02:49,  Nonspecific T wave abnormality now evident in Lateral leads  Confirmed by Hannah Mahoney ([id number]) on 7/31/2019 9:10:57 PM         All Cardiac Markers in the last 24 hours:    Lab Results   Component Value Date/Time    TROIQ <0.02 07/31/2019 04:00 PM       Last Lipid:  No results found for: CHOL, CHOLX, CHLST, CHOLV, HDL, LDL, LDLC, DLDLP, TGLX, TRIGL, TRIGP, CHHD, CHHDX    Signed By: BROOKLYN Pool     August 1, 2019

## 2019-08-01 NOTE — ROUTINE PROCESS
Patient and/or next of kin has been given and has signed the [facility] Outpatient Observation  Notification letter and all questions answered. Copy of this notice given to patient and copy placed on chart. Patient and/or next of kin has been given the Outpatient Observation Information and Notification letter and all questions answered. Care Management Interventions PCP Verified by CM: Yes Mode of Transport at Discharge: Other (see comment) Transition of Care Consult (CM Consult): Discharge Planning Current Support Network: Lives with Spouse Confirm Follow Up Transport: Family Plan discussed with Pt/Family/Caregiver: Yes Discharge Location Discharge Placement: Home Reason for Admission:   Syncope RRAT Score:         12 Plan for utilizing home health:      n/a Current Advanced Directive/Advance Care Plan: None Transition of Care Plan: Interviewed patient. Verified demographics listed on face sheet with patient; all information correct. Pt with Philip Valentino. Patient stated their PCP is Dr Kathi Puri and last appt July. Patient lives in apartment. Patient independent with ADLs prior to admission. No use of DME prior to admission. Discharge plan is     Home Patient has designated __sister______________________ to participate in his/her discharge plan and to receive any needed information. Name: Scooby Barnett Phone number:      [phone] Care Management Interventions PCP Verified by CM: Yes Mode of Transport at Discharge: Other (see comment) Transition of Care Consult (CM Consult): Discharge Planning Current Support Network: Lives with Spouse Confirm Follow Up Transport: Family Plan discussed with Pt/Family/Caregiver: Yes Discharge Location Discharge Placement: Home

## 2019-08-02 VITALS
HEIGHT: 71 IN | HEART RATE: 60 BPM | RESPIRATION RATE: 20 BRPM | OXYGEN SATURATION: 99 % | BODY MASS INDEX: 36.4 KG/M2 | DIASTOLIC BLOOD PRESSURE: 74 MMHG | SYSTOLIC BLOOD PRESSURE: 128 MMHG | WEIGHT: 260 LBS | TEMPERATURE: 98.2 F

## 2019-08-02 LAB
ECHO AO ASC DIAM: 3.14 CM
ECHO AO ROOT DIAM: 3.26 CM
ECHO IVC SNIFF: 2.56 CM
ECHO LA MAJOR AXIS: 3.73 CM
ECHO LA TO AORTIC ROOT RATIO: 1.15
ECHO LV INTERNAL DIMENSION DIASTOLIC: 4.78 CM (ref 3.9–5.3)
ECHO LV INTERNAL DIMENSION SYSTOLIC: 3.49 CM
ECHO LV IVSD: 0.78 CM (ref 0.6–0.9)
ECHO LV MASS 2D: 142.5 G (ref 67–162)
ECHO LV MASS INDEX 2D: 60.4 G/M2 (ref 43–95)
ECHO LV POSTERIOR WALL DIASTOLIC: 0.82 CM (ref 0.6–0.9)
ECHO LVOT DIAM: 2.21 CM
ECHO LVOT PEAK GRADIENT: 4.6 MMHG
ECHO LVOT PEAK VELOCITY: 106.68 CM/S
ECHO LVOT SV: 95.6 ML
ECHO LVOT VTI: 24.83 CM
ECHO MV A VELOCITY: 73.91 CM/S
ECHO MV E DECELERATION TIME (DT): 211.5 MS
ECHO MV E VELOCITY: 105.94 CM/S
ECHO MV E/A RATIO: 1.43
ECHO PULMONARY ARTERY SYSTOLIC PRESSURE (PASP): 47 MMHG
ECHO RA MINOR AXIS: 3.51 CM
ECHO TV REGURGITANT MAX VELOCITY: 243.03 CM/S
ECHO TV REGURGITANT PEAK GRADIENT: 23.6 MMHG
GLUCOSE BLD STRIP.AUTO-MCNC: 123 MG/DL (ref 70–110)
GLUCOSE BLD STRIP.AUTO-MCNC: 129 MG/DL (ref 70–110)

## 2019-08-02 PROCEDURE — 99218 HC RM OBSERVATION: CPT

## 2019-08-02 PROCEDURE — 82962 GLUCOSE BLOOD TEST: CPT

## 2019-08-02 PROCEDURE — 74011250637 HC RX REV CODE- 250/637: Performed by: FAMILY MEDICINE

## 2019-08-02 PROCEDURE — 74011250637 HC RX REV CODE- 250/637: Performed by: HOSPITALIST

## 2019-08-02 RX ADMIN — DOCUSATE SODIUM 100 MG: 100 CAPSULE, LIQUID FILLED ORAL at 08:16

## 2019-08-02 RX ADMIN — ATORVASTATIN CALCIUM 40 MG: 40 TABLET, FILM COATED ORAL at 08:16

## 2019-08-02 RX ADMIN — PANTOPRAZOLE SODIUM 20 MG: 20 TABLET, DELAYED RELEASE ORAL at 08:16

## 2019-08-02 RX ADMIN — SERTRALINE HYDROCHLORIDE 100 MG: 25 TABLET ORAL at 08:16

## 2019-08-02 RX ADMIN — POTASSIUM CHLORIDE 20 MEQ: 20 TABLET, EXTENDED RELEASE ORAL at 08:17

## 2019-08-02 RX ADMIN — MAGNESIUM OXIDE TAB 400 MG (241.3 MG ELEMENTAL MG) 400 MG: 400 (241.3 MG) TAB at 08:16

## 2019-08-02 RX ADMIN — ASPIRIN 81 MG 81 MG: 81 TABLET ORAL at 08:16

## 2019-08-02 RX ADMIN — BENZTROPINE MESYLATE 3 MG: 1 TABLET ORAL at 08:16

## 2019-08-02 RX ADMIN — FERROUS SULFATE TAB 325 MG (65 MG ELEMENTAL FE) 325 MG: 325 (65 FE) TAB at 08:16

## 2019-08-02 NOTE — PROGRESS NOTES
Problem: Discharge Planning  Goal: *Discharge to safe environment  Outcome: Resolved/Met   Home and outpt follow up    Care Management Interventions  PCP Verified by CM: Yes  Mode of Transport at Discharge:  Other (see comment)  Transition of Care Consult (CM Consult): Discharge Planning  Current Support Network: Lives with Spouse  Confirm Follow Up Transport: Family  Plan discussed with Pt/Family/Caregiver: Yes  Discharge Location  Discharge Placement: Home

## 2019-08-02 NOTE — PHYSICIAN ADVISORY
Letter of Admission Status Determination: Upgrade to Inpatient Liane Garcia was hospitalized as observation status on 7/31/2019. Since Liane Schofield required medically necessary hospital care spanning at least two midnights, she has met the benchmark for Inpatient Admission status. Based on the documented clinical condition and care plan, we recommend upgrading patient's hospitalization status to INPATIENT. The final decision regarding the patient's hospitalization status depends on the attending physician's clinical judgment. Anthony Lyn MD, OSBALDO, FACP, [facility], CHCQM-PHYADV Physician Advisor [facility] [place] [phone]

## 2019-08-02 NOTE — DISCHARGE INSTRUCTIONS
Patient armband removed and shredded    DISCHARGE SUMMARY from Nurse    PATIENT INSTRUCTIONS:    After general anesthesia or intravenous sedation, for 24 hours or while taking prescription Narcotics:  · Limit your activities  · Do not drive and operate hazardous machinery  · Do not make important personal or business decisions  · Do  not drink alcoholic beverages  · If you have not urinated within 8 hours after discharge, please contact your surgeon on call. Report the following to your surgeon:  · Excessive pain, swelling, redness or odor of or around the surgical area  · Temperature over 100.5  · Nausea and vomiting lasting longer than 4 hours or if unable to take medications  · Any signs of decreased circulation or nerve impairment to extremity: change in color, persistent  numbness, tingling, coldness or increase pain  · Any questions    What to do at Home:  Recommended activity: Activity as tolerated. If you experience any of the following symptoms paleness, nausea, sweating, a rapid heartbeat, and fainting, please follow up with primary care provider/ED. *  Please give a list of your current medications to your Primary Care Provider. *  Please update this list whenever your medications are discontinued, doses are      changed, or new medications (including over-the-counter products) are added. *  Please carry medication information at all times in case of emergency situations. These are general instructions for a healthy lifestyle:    No smoking/ No tobacco products/ Avoid exposure to second hand smoke  Surgeon General's Warning:  Quitting smoking now greatly reduces serious risk to your health.     Obesity, smoking, and sedentary lifestyle greatly increases your risk for illness    A healthy diet, regular physical exercise & weight monitoring are important for maintaining a healthy lifestyle    You may be retaining fluid if you have a history of heart failure or if you experience any of the following symptoms:  Weight gain of 3 pounds or more overnight or 5 pounds in a week, increased swelling in our hands or feet or shortness of breath while lying flat in bed. Please call your doctor as soon as you notice any of these symptoms; do not wait until your next office visit. The discharge information has been reviewed with the patient. The patient verbalized understanding. Discharge medications reviewed with the patient and appropriate educational materials and side effects teaching were provided. ___________________________________________________________________________________________________________________________________  MyChart Activation    Thank you for enrolling in [facility] [address] Please follow the instructions below to securely access your online medical record. Donuts allows you to send messages to your doctor, view your test results, renew your prescriptions, schedule appointments, and more. How Do I Sign Up? 1. In your internet browser, go to [email or web address]. 2. Click on the First Time User? Click Here link in the Sign In box. You will see the New Member Sign Up page. 3. Enter your "[employer]t Access Code exactly as it appears below. You will not need to use this code after youve completed the sign-up process. If you do not sign up before the expiration date, you must request a new code. Donuts Access Code: [email or web address]  Expires: 9/14/2019  3:47 PM     4. Enter the last four digits of your Social Security Number (xxxx) and Date of Birth (mm/dd/yyyy) as indicated and click Submit. You will be taken to the next sign-up page. 5. Create a "[employer]t ID. This will be your Donuts login ID and cannot be changed, so think of one that is secure and easy to remember. 6. Create a Donuts password. You can change your password at any time. 7. Enter your Password Reset Question and Answer. This can be used at a later time if you forget your password.    8. Enter your e-mail address. You will receive e-mail notification when new information is available in [address] 9. Click Sign Up. You can now view your medical record. Additional Information    Remember, You.it is NOT to be used for urgent needs. For medical emergencies, dial 911. Now available from your iPhone and Android! Patient Education        Bradycardia: Care Instructions  Your Care Instructions    Bradycardia is a slow heart rate. A slow heart rate can be normal and healthy. Or it could be a sign of a problem with the heart's electrical system. If your heart beats too slowly, it may not supply your body with enough blood. This can make you weak or dizzy. Or it may make you pass out. Bradycardia can be caused by many things. This includes medicine, certain medical conditions, and changes in the heart that are the result of aging. How bradycardia is treated depends on what is causing it. Treatments include treating another health problem, changing a medicine, and getting a pacemaker. Treatment also depends on the symptoms. If bradycardia doesn't cause symptoms, it may not be treated. You and your doctor can decide what treatment is right for you. Follow-up care is a key part of your treatment and safety. Be sure to make and go to all appointments, and call your doctor if you are having problems. It's also a good idea to know your test results and keep a list of the medicines you take. How can you care for yourself at home? · Take your medicines exactly as prescribed. Call your doctor if you think you are having a problem with your medicine. If your bradycardia is caused by another disease, your doctor will try to treat the disease. If it is caused by heart medicines, he or she will adjust your medicines. · Make lifestyle changes to improve your heart health. ? Eat a heart-healthy diet that includes vegetables, fruits, nuts, beans, lean meat, fish, and whole grains.  Limit alcohol, sodium, and sugar.  ? Get regular exercise. Try for 30 minutes on most days of the week. If you do not have other heart problems, you likely do not have limits on the type or level of activity that you can do. You may want to walk, swim, bike, or do other activities. Ask your doctor what level of exercise is safe for you.  ? Stay at a healthy weight. Lose weight if you need to.  ? Do not smoke. If you need help quitting, talk to your doctor about stop-smoking programs and medicines. These can increase your chances of quitting for good. ? Manage other health problems such as high blood pressure, high cholesterol, and diabetes. · If you get a pacemaker, you will get information about it. When should you call for help? Call 911 anytime you think you may need emergency care. For example, call if:    · You have symptoms of sudden heart failure. These may include:  ? Severe trouble breathing. ? A fast or irregular heartbeat. ? Coughing up pink, foamy mucus. ? You passed out.     · You have symptoms of a stroke. These may include:  ? Sudden numbness, tingling, weakness, or loss of movement in your face, arm, or leg, especially on only one side of your body. ? Sudden vision changes. ? Sudden trouble speaking. ? Sudden confusion or trouble understanding simple statements. ? Sudden problems with walking or balance. ? A sudden, severe headache that is different from past headaches.    Call your doctor now or seek immediate medical care if:    · You have new or changed symptoms of heart failure, such as:  ? New or increased shortness of breath. ? New or worse swelling in your legs, ankles, or feet. ? Sudden weight gain, such as more than 2 to 3 pounds in a day or 5 pounds in a week. (Your doctor may suggest a different range of weight gain.)  ? Feeling dizzy or lightheaded or like you may faint. ? Feeling so tired or weak that you cannot do your usual activities. ? Not sleeping well. Shortness of breath wakes you at night. You need extra pillows to prop yourself up to breathe easier.    Watch closely for changes in your health, and be sure to contact your doctor if:    · You do not get better as expected. Where can you learn more? Go to http://alonzo-briana.info/. Enter R608 in the search box to learn more about \"Bradycardia: Care Instructions. \"  Current as of: July 22, 2018  Content Version: 12.1  © 6994-1937 [employer]flux. Care instructions adapted under license by [employer] (which disclaims liability or warranty for this information). If you have questions about a medical condition or this instruction, always ask your healthcare professional. [place] [address] any warranty or liability for your use of this information. Patient Education        Vasovagal Syncope: Care Instructions  Your Care Instructions    Vasovagal syncope (say \"tul-xfu-QHR-gul RVDH-dec-qeo\")is sudden dizziness or fainting that can be set off by things such as pain, stress, fear, or trauma. You may sweat or feel lightheaded, sick to your stomach, or tingly. The problem causes the heart rate to slow and the blood vessels to widen, or dilate, for a short time. When this happens, blood pools in the lower body, and less blood goes to the brain. You can usually get relief by lying down with your legs raised (elevated). This helps more blood to flow to your brain and may help relieve symptoms like feeling dizzy. Some doctors may recommend a technique that involves tensing your fists and arms. This type of fainting is often easy to predict. For example, it happens to some people when they see blood or have to get a shot. They may feel symptoms before they faint. An episode of vasovagal syncope usually responds well to self-care. Other treatment often isn't needed. But if the fainting keeps happening, your doctor may suggest further treatments.   Follow-up care is a key part of your treatment and safety. Be sure to make and go to all appointments, and call your doctor if you are having problems. It's also a good idea to know your test results and keep a list of the medicines you take. How can you care for yourself at home? · Drink plenty of fluids to prevent dehydration. If you have kidney, heart, or liver disease and have to limit fluids, talk with your doctor before you increase your fluid intake. · Try to avoid things that you think may set off vasovagal syncope. · Talk to your doctor about any medicines you take. Some medicines may increase the chance of this condition occurring. · If you feel symptoms, lie down with your legs raised. Talk to your doctor about what to do if your symptoms come back. When should you call for help? Call 911 anytime you think you may need emergency care. For example, call if:    · You have symptoms of a heart problem. These may include:  ? Chest pain or pressure. ? Severe trouble breathing. ? A fast or irregular heartbeat.    Watch closely for changes in your health, and be sure to contact your doctor if:    · You have more episodes of fainting at home.     · You do not get better as expected. Where can you learn more? Go to http://alonzo-briana.info/. Enter L754 in the search box to learn more about \"Vasovagal Syncope: Care Instructions. \"  Current as of: September 23, 2018  Content Version: 12.1  © 8842-3424 [employer]. Care instructions adapted under license by [employer] (which disclaims liability or warranty for this information). If you have questions about a medical condition or this instruction, always ask your healthcare professional. [place] [address] any warranty or liability for your use of this information. Patient Education        Fainting: Care Instructions  Your Care Instructions    When you faint, or pass out, you lose consciousness for a short time.  A brief drop in blood flow to the brain often causes it. When you fall or lie down, more blood flows to your brain and you regain consciousness. Emotional stress, pain, or overheating--especially if you have been standing--can make you faint. In these cases, fainting is usually not serious. But fainting can be a sign of a more serious problem. Your doctor may want you to have more tests to rule out other causes. The treatment you need depends on the reason why you fainted. The doctor has checked you carefully, but problems can develop later. If you notice any problems or new symptoms, get medical treatment right away. Follow-up care is a key part of your treatment and safety. Be sure to make and go to all appointments, and call your doctor if you are having problems. It's also a good idea to know your test results and keep a list of the medicines you take. How can you care for yourself at home? · Drink plenty of fluids to prevent dehydration. If you have kidney, heart, or liver disease and have to limit fluids, talk with your doctor before you increase your fluid intake. When should you call for help? Call 911 anytime you think you may need emergency care. For example, call if:    · You have symptoms of a heart problem. These may include:  ? Chest pain or pressure. ? Severe trouble breathing. ? A fast or irregular heartbeat. ? Lightheadedness or sudden weakness. ? Coughing up pink, foamy mucus. ? Passing out. After you call 911, the  may tell you to chew 1 adult-strength or 2 to 4 low-dose aspirin. Wait for an ambulance. Do not try to drive yourself.     · You have symptoms of a stroke. These may include:  ? Sudden numbness, tingling, weakness, or loss of movement in your face, arm, or leg, especially on only one side of your body. ? Sudden vision changes. ? Sudden trouble speaking. ? Sudden confusion or trouble understanding simple statements. ? Sudden problems with walking or balance.   ? A sudden, severe headache that is different from past headaches.     · You passed out (lost consciousness) again.    Watch closely for changes in your health, and be sure to contact your doctor if:    · You do not get better as expected. Where can you learn more? Go to http://alonzo-briana.info/. Enter N681 in the search box to learn more about \"Fainting: Care Instructions. \"  Current as of: September 23, 2018  Content Version: 12.1  © 4910-2562 [employer]. Care instructions adapted under license by [employer] (which disclaims liability or warranty for this information). If you have questions about a medical condition or this instruction, always ask your healthcare professional. [address] any warranty or liability for your use of this information.

## 2019-08-02 NOTE — PROGRESS NOTES
6422 -- Bedside shift change report given to MIGUEL Palacios (oncoming nurse) by Monique Ward RN (offgoing nurse). Report included the following information SBAR, Kardex, Intake/Output, MAR and Recent Results. A/O x3, no pain noted, SB 50's per tele-monitor, IV flushed and infusing, call bell and personal belongings in reach.      0816 -- AM medications given, well tolerated, will continue to monitor.      1105 -- Reassessment completed, no change in patient condition, will continue to monitor.      1310 -- Patient discharged.

## 2019-08-02 NOTE — PROGRESS NOTES
conducted an initial consultation and Spiritual Assessment for Piter Lazaro, who is a 61 y.o.,female. Patients Primary Language is: [place]. According to the patients EMR Latter-day Affiliation is: No Baptism. 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 Explored issues of jose ramon, spirituality and/or Pentecostal needs while hospitalized. Listened empathically. Provided chaplaincy education. Provided information about Spiritual Care Services. Offered prayer and assurance of continued prayers on patient's behalf. Chart reviewed. The following outcomes were achieved:  Patient shared some information about their medical narrative and spiritual journey/beliefs. Patient processed feeling about current hospitalization. Patient expressed gratitude for the 's visit. Assessment:  Patient did not indicate any spiritual or Pentecostal issues which require Spiritual Care Services interventions at this time. Patient does not have any Pentecostal/cultural needs that will affect patients preferences in health care. Plan:  Chaplains will continue to follow and will provide pastoral care on an as needed or requested basis.  recommends bedside caregivers page  on duty if patient shows signs of acute spiritual or emotional distress.     [address] [facility]   Staff [address] [facility]   [phone]

## 2019-08-02 NOTE — ROUTINE PROCESS
Verbal shift change report given to [facility] RN (oncoming nurse) by Scott Alvarado RN (offgoing nurse). Report included the following information SBAR, Intake/Output, MAR and Recent Results. Telemetry placed. Shift uneventful. Bedside and Verbal shift change report given to Suzanne RN (oncoming nurse) by [facility] RN (offgoing nurse). Report included the following information SBAR, Intake/Output, MAR, Recent Results and Cardiac Rhythm SB with prolonged QT, occ PVCs. Jacob Ward

## 2019-08-02 NOTE — DISCHARGE SUMMARY
Discharge Summary    Patient: Charmaine Vega               Sex: female          DOA: 7/31/2019       YOB: 1960      Age:  61 y.o.        LOS:  LOS: 0 days                Admit Date: 7/31/2019    Discharge Date: 8/2/2019    Admission Diagnoses: Syncope [R55]    Discharge Diagnoses:    Hospital Problems  Date Reviewed: 3/26/2015          Codes Class Noted POA    Prolonged Q-T interval on ECG ICD-10-CM: R94.31  ICD-9-CM: 794.31  8/1/2019 Unknown        History of torsades de pointe due to drug ICD-10-CM: Z86.79  ICD-9-CM: V12.59  8/1/2019 Unknown        * (Principal) Syncope ICD-10-CM: R55  ICD-9-CM: 780.2  7/31/2019 Yes        History of depression ICD-10-CM: Z86.59  ICD-9-CM: V11.8  4/8/2013 Yes              Discharge Disposition: Home or Self Care     Discharge Condition:  Improved    Hospital Course:  59F with h/o DM2, HFpEF, HTN, bipolar/depression, who presented with near syncope prior to arrival. She was found to have ZOE. She was placed under observation and treated with IVF. She was recently found to have similar presentation in 5/2019 and found to have syncope 2/2 NSVT and torsades secondary to hypokalemia/hypomagnesemia. During that admission citalopram was discontinued and she was transitioned to zoloft and seroquel. during this admission EKG showed prolonged QT, and the patient was still taking citalopram despite discontinuation of the medicine. Cardiology was consulted, echocardiogram was done which showed low normal LV systolic function. Cardiology recommended discontinuing citalopram. This was discussed with the patient who expressed understanding. Her ZOE resolved with fluids and she was tolerating a regular diet and ambulating. She was discharged to home in stable condition.      Consults:    Cardiology    Labs:  Labs: Results:       Chemistry Recent Labs     08/01/19  0530 07/31/19  1600   GLU 98 137*    135*   K 3.9 4.2    99*   CO2 28 28   BUN 14 15   CREA 1.13 2.03*   CA 8.4* 8.6   AGAP 7 8   BUCR 12 7*   AP  --  103   TP  --  6.8   ALB  --  3.4   GLOB  --  3.4   AGRAT  --  1.0      CBC w/Diff Recent Labs     08/01/19  0530 07/31/19  1600   WBC 8.1 8.6   RBC 3.52* 3.91*   HGB 9.6* 10.7*   HCT 29.4* 32.2*    325   GRANS  --  55   LYMPH  --  28   EOS  --  3      Cardiac Enzymes No results for input(s): CPK, CKND1, LAURA in the last 72 hours. No lab exists for component: CKRMB, TROIP   Coagulation No results for input(s): PTP, INR, APTT in the last 72 hours. No lab exists for component: INREXT    Lipid Panel No results found for: CHOL, CHOLPOCT, CHOLX, CHLST, CHOLV, 561812, HDL, LDL, LDLC, DLDLP, 785863, VLDLC, VLDL, TGLX, TRIGL, TRIGP, TGLPOCT, CHHD, CHHDX   BNP No results for input(s): BNPP in the last 72 hours. Liver Enzymes Recent Labs     07/31/19  1600   TP 6.8   ALB 3.4      SGOT 14      Thyroid Studies No results found for: T4, T3U, TSH, TSHEXT         Significant Diagnostic Studies:   Ct Head Wo Cont    Result Date: 7/31/2019  EXAM: CT head INDICATION: Syncope/fainting COMPARISON: None. TECHNIQUE: Axial CT imaging of the head was performed without intravenous contrast. One or more dose reduction techniques were used on this CT: automated exposure control, adjustment of the mAs and/or kVp according to patient size, and iterative reconstruction techniques. The specific techniques used on this CT exam have been documented in the patient's electronic medical record. Digital Imaging and Communications in Medicine (DICOM) format image data are available to nonaffiliated external healthcare facilities or entities on a secured, media free, reciprocally searchable basis with patient authorization for at least a 12-month period after this study. _______________ FINDINGS: BRAIN AND POSTERIOR FOSSA: There is no intracranial hemorrhage, mass effect, or midline shift. There are no areas of abnormal parenchymal attenuation.  EXTRA-AXIAL SPACES AND MENINGES: There are no abnormal extra-axial fluid collections. CALVARIUM: Intact. SINUSES: Clear. OTHER: None. _______________     IMPRESSION: 1. No acute intracranial process. Xr Chest Port    Result Date: 7/31/2019  EXAM: XR CHEST PORT CLINICAL INDICATION/HISTORY: Syncopal event -Additional: None COMPARISON: June 28, 2015 TECHNIQUE: Frontal view of the chest _______________ FINDINGS: HEART AND MEDIASTINUM: Stable cardiac size and mediastinal contours, with cardiac size upper limits of normal for AP technique. LUNGS AND PLEURAL SPACES: No focal pneumonic consolidation, pneumothorax, or pleural effusion. BONY THORAX AND SOFT TISSUES: No acute osseous abnormality. Mild degenerative changes of the shoulder girdles present bilaterally. Defibrillating pad projects over the chest wall. _______________     IMPRESSION: No acute radiographic cardiopulmonary abnormality. Discharge Medications:     Current Discharge Medication List      CONTINUE these medications which have NOT CHANGED    Details   magnesium oxide (MAG-OX) 400 mg tablet Take 400 mg by mouth two (2) times a day. aspirin 81 mg chewable tablet Take 81 mg by mouth two (2) times a day. potassium chloride (K-DUR, KLOR-CON) 20 mEq tablet Take 20 mEq by mouth daily. metFORMIN (GLUMETZA ER) 500 mg TG24 24 hour tablet Take 500 mg by mouth daily. docusate sodium (COLACE) 100 mg capsule Take 100 mg by mouth two (2) times a day. Ferrous Fumarate 325 mg (106 mg iron) Tab Take  by mouth.      lovastatin (MEVACOR) 40 mg tablet Take 40 mg by mouth nightly. lisinopril (PRINIVIL, ZESTRIL) 40 mg tablet Take 40 mg by mouth daily. benztropine (COGENTIN) 2 mg tablet Take 3 mg by mouth two (2) times a day. hydrochlorothiazide (HYDRODIURIL) 25 mg tablet Take 25 mg by mouth daily. metFORMIN (GLUCOPHAGE) 1,000 mg tablet Take 1,000 mg by mouth two (2) times daily (with meals).         atorvastatin (LIPITOR) 40 mg tablet Take 40 mg by mouth daily.        sertraline (ZOLOFT) 100 mg tablet Take 100 mg by mouth daily. omeprazole (PRILOSEC) 20 mg capsule Take 20 mg by mouth daily. LORazepam (ATIVAN) 0.5 mg tablet Take 1 Tab by mouth four (4) times daily. Max Daily Amount: 2 mg. Qty: 20 Tab, Refills: 0      promethazine (PHENERGAN) 25 mg tablet Take 1 Tab by mouth every eight (8) hours as needed for Nausea. Qty: 12 Tab, Refills: 0      Cane beto AD for chronic knee pain  Qty: 1 Device, Refills: 0      perphenazine (TRILAFON) 8 mg tablet Take 8 mg by mouth three (3) times daily. nateglinide (STARLIX) 60 mg tablet Take 60 mg by mouth Before breakfast, lunch, and dinner. ergocalciferol (VITAMIN D2) 50,000 unit capsule Take 50,000 Units by mouth. naproxen sodium (ANAPROX DS) 550 mg tablet Take 1 Tab by mouth two (2) times daily (with meals). Qty: 12 Tab, Refills: 0         STOP taking these medications       citalopram (CELEXA) 40 mg tablet Comments:   Reason for Stopping:               Activity: Activity as tolerated    Diet: Cardiac Diet    Follow-up: PCP in 1 week.          Total time spent including time spent on final examination and discharge discussion, discharge documentation and records reviewed and medication reconciliation: > 30 minutes    Lynn Fay MD  [facility]